# Patient Record
Sex: MALE | Race: BLACK OR AFRICAN AMERICAN | Employment: UNEMPLOYED | URBAN - METROPOLITAN AREA
[De-identification: names, ages, dates, MRNs, and addresses within clinical notes are randomized per-mention and may not be internally consistent; named-entity substitution may affect disease eponyms.]

---

## 2021-09-05 ENCOUNTER — APPOINTMENT (OUTPATIENT)
Dept: GENERAL RADIOLOGY | Age: 36
End: 2021-09-05
Payer: OTHER MISCELLANEOUS

## 2021-09-05 ENCOUNTER — HOSPITAL ENCOUNTER (EMERGENCY)
Age: 36
Discharge: HOME | End: 2021-09-05
Attending: EMERGENCY MEDICINE
Payer: OTHER MISCELLANEOUS

## 2021-09-05 VITALS
WEIGHT: 200 LBS | HEART RATE: 70 BPM | BODY MASS INDEX: 26.51 KG/M2 | DIASTOLIC BLOOD PRESSURE: 85 MMHG | HEIGHT: 73 IN | RESPIRATION RATE: 16 BRPM | TEMPERATURE: 98.5 F | OXYGEN SATURATION: 96 % | SYSTOLIC BLOOD PRESSURE: 143 MMHG

## 2021-09-05 DIAGNOSIS — M79.643 TENDERNESS OF ANATOMICAL SNUFFBOX: ICD-10-CM
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
## 2021-09-05 PROCEDURE — 99284 EMERGENCY DEPT VISIT MOD MDM: CPT

## 2021-09-05 PROCEDURE — 73130 X-RAY EXAM OF HAND: CPT

## 2021-09-05 PROCEDURE — 6370000000 HC RX 637 (ALT 250 FOR IP): Performed by: EMERGENCY MEDICINE

## 2021-09-05 PROCEDURE — 73030 X-RAY EXAM OF SHOULDER: CPT

## 2021-09-05 PROCEDURE — 73110 X-RAY EXAM OF WRIST: CPT

## 2021-09-05 RX ORDER — IBUPROFEN 600 MG/1
600 TABLET ORAL EVERY 8 HOURS PRN
Qty: 30 TABLET | Refills: 0 | Status: SHIPPED | OUTPATIENT
Start: 2021-09-05

## 2021-09-05 RX ORDER — IBUPROFEN 600 MG/1
600 TABLET ORAL ONCE
Status: COMPLETED | OUTPATIENT
Start: 2021-09-05 | End: 2021-09-05

## 2021-09-05 RX ADMIN — IBUPROFEN 600 MG: 600 TABLET, FILM COATED ORAL at 05:20

## 2021-09-05 ASSESSMENT — PAIN DESCRIPTION - LOCATION: LOCATION: LEG

## 2021-09-05 ASSESSMENT — PAIN SCALES - GENERAL
PAINLEVEL_OUTOF10: 7
PAINLEVEL_OUTOF10: 8

## 2021-09-05 ASSESSMENT — PAIN DESCRIPTION - DESCRIPTORS: DESCRIPTORS: ACHING

## 2021-09-05 ASSESSMENT — ENCOUNTER SYMPTOMS
BACK PAIN: 0
SHORTNESS OF BREATH: 0
ABDOMINAL PAIN: 0
VOMITING: 0

## 2021-09-05 ASSESSMENT — PAIN DESCRIPTION - ORIENTATION: ORIENTATION: LEFT

## 2021-09-05 ASSESSMENT — PAIN DESCRIPTION - FREQUENCY: FREQUENCY: CONTINUOUS
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
## 2024-02-27 ENCOUNTER — OFFICE VISIT (OUTPATIENT)
Dept: PRIMARY CARE | Facility: CLINIC | Age: 39
End: 2024-02-27
Payer: MEDICARE

## 2024-02-27 VITALS
BODY MASS INDEX: 27.83 KG/M2 | HEIGHT: 73 IN | WEIGHT: 210 LBS | DIASTOLIC BLOOD PRESSURE: 117 MMHG | SYSTOLIC BLOOD PRESSURE: 139 MMHG | HEART RATE: 81 BPM

## 2024-02-27 DIAGNOSIS — R30.0 DYSURIA: ICD-10-CM

## 2024-02-27 DIAGNOSIS — Z00.00 WELL ADULT EXAM: Primary | ICD-10-CM

## 2024-02-27 DIAGNOSIS — R03.0 ELEVATED BLOOD PRESSURE READING: ICD-10-CM

## 2024-02-27 PROCEDURE — 1036F TOBACCO NON-USER: CPT | Performed by: FAMILY MEDICINE

## 2024-02-27 PROCEDURE — 99386 PREV VISIT NEW AGE 40-64: CPT | Performed by: FAMILY MEDICINE

## 2024-02-27 ASSESSMENT — ENCOUNTER SYMPTOMS
LOSS OF SENSATION IN FEET: 0
OCCASIONAL FEELINGS OF UNSTEADINESS: 0
DEPRESSION: 0

## 2024-02-27 NOTE — PROGRESS NOTES
Pt is here to [facility] care, pt has concerns of overall health check and blood work.       Chief Complaint:  No chief complaint on file.  History Of Present Illness:   Ld Lerma III is a 38 y.o. male        Patient has not had regular care with PCP before - he would like to establish care today.     Patient was diagnosed with narcolepsy with cataplexy many years ago after seeing sleep medicine. Took medications for a short time without significant benefit. Feels like sleep hygiene and good eating habits are helping him manage it. Does not want to see sleep medicine.    Patient reports that he has been relatively healthy and does not take any medications. The day before Candy's Day, caught a cold. At the time, felt like there were some “rocks” in his esophagus. He had chills and sore throat for about 5 days. It has since resolved.    Endorses occasional reflux symptoms that are worse at nighttime.    Also complains of some small hard mobile “lumps” on his chest under the skin that have been there for many years. They are not painful. They have not been growing.       PMH: narcolepsy with cataplexy; denies hx of HTN, diabetes, CAD, cancer    PSH: ganglion cyst removal     Healthcare team: none  ALL: denies     SH: previously smoked marijuana but stopped a few months ago; two months ago, started vaping but then stopped two weeks prior to presentation; 2 year tobacco smoking history; denies illicit drugs     FH: mom- diabetes, HTN; dad - CAD w/stents; granddad had colon cancer when he was 75-80  Flu shot:  - declines     Immunizations:   - utd per pt.     Colonoscopy: none in the past    Mood: denies any active issues     Sleep: endorses good sleep hygiene habits; reports that his narcolepsy had previously interfered with his ability to get a job                           Review of Systems:     Negative  Constitutional:   - fever   - chills   - night sweats  - unexpected weight change     Eyes:   - loss of vision  -  "double vision  - floaters     Ear/Nose/Throat/Mouth:   - sore throat  - hearing changes  - sinus congestion     Cardiovascular:   - chest pain  - chest heaviness  - palpitations  - swelling in ankles     Musculoskeletal:   - bone pain  - muscle pain  - joint pain     Respiratory:   - shortness of breath  - difficulty breathing  - frequent cough  - wheezing     Neurological:   - tremors  - dizzy spells  - numbness   - tingling     Gastrointestinal:   - abdominal pain  - nausea  - vomiting  - constipation  - diarrhea  - bloody stools  - loss of bowel control     Genitourinary:   - urinary incontinence  - increased urinary frequency  - painful urination  - blood in urine     Skin:   - Rash  - worrisome moles     Endocrine:   - excessive thirst  - feeling too hot  - feeling too cold  - feeling tired  - fatigue  Hematologic/Lymphatic:   - swollen glands  - blood clotting problems  - easy bruising.     Psychological:   - feelings of depression  - feelings of anxiety.     Sexual:   - sexual health concerns   ```     Positive  Psychological:   - feeling generally happy  - feeling safe at home                   Last Recorded Vitals:  Vitals:    02/27/24 1038   BP: (!) 139/117   Pulse: 81   Weight: 95.3 kg (210 lb)   Height: 1.854 m (6' 1\")        Past Medical History:  He has no past medical history on file.     Past Surgical History:  He has no past surgical history on file.     Social History:  He reports that he has never smoked. He has never used smokeless tobacco. No history on file for alcohol use and drug use.     Family History:  No family history on file.  Allergies:  Patient has no known allergies.     Outpatient Medications:  No current outpatient medications     Physical Exam:  GENERAL: Well developed, well nourished, alert and cooperative, and appears to be in no acute distress.     PSYCH: mood pleasant and appropriate     HEAD: normocephalic     EYES: PERRL, EOMI. vision is grossly intact.     EARS: Hearing " grossly intact.  Right external auditory canals clear.  Left external auditory canals clear.  .     NOSE:  Nares patent b/l.   No bleeding nasal polyps. No nasal discharge.     THROAT:    Oropharynx clear.  No inflammation, swelling, exudate, or lesions.   Teeth and gingiva in good general condition.     NECK: Neck supple, non-tender.   No masses, no thyromegaly.  No anterior cervical lymphadenopathy.     CARDIAC:   RRR.   No murmur.     LUNGS: Good respiratory effort.  Clear to auscultation b/l without rales, rhonchi, wheezing.     ABD: soft, nontender  no masses palpated.   No HSM. No R/G.  No CVA tenderness to palpation b/l     GAIT: Normal     BACK: No gross spinal deformity on inspection.      EXTREMITIES: All 4 extremities are warm and well perfused.   Peripheral pulses intact.   No varicosities.   No cyanosis, no pallor.   No peripheral edema.     NEUROLOGICAL: Strength and sensation symmetric and intact throughout all 4 extremities.  CN II-XII grossly intact.     SKIN: Skin normal color  On the midsternal chest and R pectoral area, there are 4 small hard, mobile nodules with no overlying skin changes.       MUSCULOSKELETAL: 5/5 strength of bilateral upper and lower extremities.          Assessment/Plan   Problem List Items Addressed This Visit    None  Visit Diagnoses         Codes    Well adult exam    -  Primary Z00.00            Welcome to the office. Please let the office know at least one week in advance if you need an immunization or booster shot.     Ordering fasting blood work, will discuss at your 2-week follow-up visit or I can call you when I get the results.    Regarding the lumps on your anterior chest wall, likely benign, agreed to monitor for now but if your concern arises we can order up imaging such as ultrasound of your chest wall.    Your blood pressure was slightly elevated today in the office, agreed to have you follow-up in 2 weeks to recheck it.    Some of the stomach symptoms around  Candy's Day 2024 may be related to acid reflux, we may rediscuss this at your follow-up visit.    Regarding referrals, you can call the following phone number to attempt to schedule: [phone].  Another potential phone number is: [phone] ([phone]).  The number for pediatric referrals is: [phone].              Shar Amaro, DO

## 2024-08-21 ENCOUNTER — TELEPHONE (OUTPATIENT)
Dept: PRIMARY CARE | Facility: CLINIC | Age: 39
End: 2024-08-21
Payer: MEDICARE

## 2024-09-16 ENCOUNTER — APPOINTMENT (OUTPATIENT)
Dept: PRIMARY CARE | Facility: CLINIC | Age: 39
End: 2024-09-16
Payer: MEDICARE

## 2024-12-03 ENCOUNTER — APPOINTMENT (OUTPATIENT)
Dept: PRIMARY CARE | Facility: CLINIC | Age: 39
End: 2024-12-03
Payer: MEDICARE

## 2025-02-14 ENCOUNTER — OFFICE VISIT (OUTPATIENT)
Dept: URGENT CARE | Age: 40
End: 2025-02-14
Payer: MEDICARE

## 2025-02-14 ENCOUNTER — HOSPITAL ENCOUNTER (EMERGENCY)
Facility: HOSPITAL | Age: 40
Discharge: HOME | End: 2025-02-14
Attending: EMERGENCY MEDICINE
Payer: MEDICARE

## 2025-02-14 ENCOUNTER — APPOINTMENT (OUTPATIENT)
Dept: RADIOLOGY | Facility: HOSPITAL | Age: 40
End: 2025-02-14
Payer: MEDICARE

## 2025-02-14 VITALS
SYSTOLIC BLOOD PRESSURE: 126 MMHG | WEIGHT: 210 LBS | HEIGHT: 73 IN | DIASTOLIC BLOOD PRESSURE: 75 MMHG | RESPIRATION RATE: 18 BRPM | TEMPERATURE: 99 F | OXYGEN SATURATION: 95 % | HEART RATE: 96 BPM | BODY MASS INDEX: 27.83 KG/M2

## 2025-02-14 VITALS
OXYGEN SATURATION: 95 % | TEMPERATURE: 102.4 F | WEIGHT: 210 LBS | SYSTOLIC BLOOD PRESSURE: 148 MMHG | HEART RATE: 104 BPM | RESPIRATION RATE: 16 BRPM | BODY MASS INDEX: 27.71 KG/M2 | DIASTOLIC BLOOD PRESSURE: 75 MMHG

## 2025-02-14 DIAGNOSIS — R68.89 FLU-LIKE SYMPTOMS: Primary | ICD-10-CM

## 2025-02-14 DIAGNOSIS — J11.1 FLU: Primary | ICD-10-CM

## 2025-02-14 DIAGNOSIS — J10.1 INFLUENZA A: ICD-10-CM

## 2025-02-14 LAB
ALBUMIN SERPL BCP-MCNC: 4.5 G/DL (ref 3.4–5)
ALP SERPL-CCNC: 39 U/L (ref 33–120)
ALT SERPL W P-5'-P-CCNC: 25 U/L (ref 10–52)
ANION GAP SERPL CALC-SCNC: 16 MMOL/L (ref 10–20)
AST SERPL W P-5'-P-CCNC: 18 U/L (ref 9–39)
BASOPHILS # BLD AUTO: 0.04 X10*3/UL (ref 0–0.1)
BASOPHILS NFR BLD AUTO: 0.5 %
BILIRUB SERPL-MCNC: 0.6 MG/DL (ref 0–1.2)
BUN SERPL-MCNC: 16 MG/DL (ref 6–23)
CALCIUM SERPL-MCNC: 9.5 MG/DL (ref 8.6–10.6)
CHLORIDE SERPL-SCNC: 99 MMOL/L (ref 98–107)
CO2 SERPL-SCNC: 25 MMOL/L (ref 21–32)
CREAT SERPL-MCNC: 1.04 MG/DL (ref 0.5–1.3)
EGFRCR SERPLBLD CKD-EPI 2021: >90 ML/MIN/1.73M*2
EOSINOPHIL # BLD AUTO: 0 X10*3/UL (ref 0–0.7)
EOSINOPHIL NFR BLD AUTO: 0 %
ERYTHROCYTE [DISTWIDTH] IN BLOOD BY AUTOMATED COUNT: 12.1 % (ref 11.5–14.5)
GLUCOSE SERPL-MCNC: 112 MG/DL (ref 74–99)
HCT VFR BLD AUTO: 40.6 % (ref 41–52)
HGB BLD-MCNC: 14.2 G/DL (ref 13.5–17.5)
IMM GRANULOCYTES # BLD AUTO: 0.06 X10*3/UL (ref 0–0.7)
IMM GRANULOCYTES NFR BLD AUTO: 0.8 % (ref 0–0.9)
LYMPHOCYTES # BLD AUTO: 0.7 X10*3/UL (ref 1.2–4.8)
LYMPHOCYTES NFR BLD AUTO: 9.5 %
MCH RBC QN AUTO: 28.9 PG (ref 26–34)
MCHC RBC AUTO-ENTMCNC: 35 G/DL (ref 32–36)
MCV RBC AUTO: 83 FL (ref 80–100)
MONOCYTES # BLD AUTO: 1.11 X10*3/UL (ref 0.1–1)
MONOCYTES NFR BLD AUTO: 15 %
NEUTROPHILS # BLD AUTO: 5.48 X10*3/UL (ref 1.2–7.7)
NEUTROPHILS NFR BLD AUTO: 74.2 %
NRBC BLD-RTO: 0 /100 WBCS (ref 0–0)
PLATELET # BLD AUTO: 146 X10*3/UL (ref 150–450)
POC RAPID INFLUENZA A: POSITIVE
POC RAPID INFLUENZA B: NEGATIVE
POC SARS-COV-2 AG BINAX: NORMAL
POTASSIUM SERPL-SCNC: 3.6 MMOL/L (ref 3.5–5.3)
PROT SERPL-MCNC: 7.6 G/DL (ref 6.4–8.2)
RBC # BLD AUTO: 4.91 X10*6/UL (ref 4.5–5.9)
SODIUM SERPL-SCNC: 136 MMOL/L (ref 136–145)
WBC # BLD AUTO: 7.4 X10*3/UL (ref 4.4–11.3)

## 2025-02-14 PROCEDURE — 99284 EMERGENCY DEPT VISIT MOD MDM: CPT | Mod: 25 | Performed by: EMERGENCY MEDICINE

## 2025-02-14 PROCEDURE — 71046 X-RAY EXAM CHEST 2 VIEWS: CPT

## 2025-02-14 PROCEDURE — 2500000002 HC RX 250 W HCPCS SELF ADMINISTERED DRUGS (ALT 637 FOR MEDICARE OP, ALT 636 FOR OP/ED): Performed by: EMERGENCY MEDICINE

## 2025-02-14 PROCEDURE — 85025 COMPLETE CBC W/AUTO DIFF WBC: CPT

## 2025-02-14 PROCEDURE — 71046 X-RAY EXAM CHEST 2 VIEWS: CPT | Performed by: RADIOLOGY

## 2025-02-14 PROCEDURE — 36415 COLL VENOUS BLD VENIPUNCTURE: CPT

## 2025-02-14 PROCEDURE — 99284 EMERGENCY DEPT VISIT MOD MDM: CPT

## 2025-02-14 PROCEDURE — 80053 COMPREHEN METABOLIC PANEL: CPT

## 2025-02-14 PROCEDURE — 2500000001 HC RX 250 WO HCPCS SELF ADMINISTERED DRUGS (ALT 637 FOR MEDICARE OP)

## 2025-02-14 PROCEDURE — 2500000004 HC RX 250 GENERAL PHARMACY W/ HCPCS (ALT 636 FOR OP/ED)

## 2025-02-14 RX ORDER — ACETAMINOPHEN 500 MG
1000 TABLET ORAL EVERY 6 HOURS PRN
Qty: 30 TABLET | Refills: 0 | Status: SHIPPED | OUTPATIENT
Start: 2025-02-14 | End: 2025-02-24

## 2025-02-14 RX ORDER — OSELTAMIVIR PHOSPHATE 75 MG/1
75 CAPSULE ORAL ONCE
Status: COMPLETED | OUTPATIENT
Start: 2025-02-14 | End: 2025-02-14

## 2025-02-14 RX ORDER — ONDANSETRON 4 MG/1
4 TABLET, ORALLY DISINTEGRATING ORAL ONCE
Status: COMPLETED | OUTPATIENT
Start: 2025-02-14 | End: 2025-02-14

## 2025-02-14 RX ORDER — ONDANSETRON 4 MG/1
4 TABLET, ORALLY DISINTEGRATING ORAL EVERY 8 HOURS PRN
Qty: 20 TABLET | Refills: 0 | Status: SHIPPED | OUTPATIENT
Start: 2025-02-14 | End: 2025-02-21

## 2025-02-14 RX ORDER — BENZONATATE 100 MG/1
100 CAPSULE ORAL EVERY 8 HOURS
Qty: 21 CAPSULE | Refills: 0 | Status: SHIPPED | OUTPATIENT
Start: 2025-02-14 | End: 2025-02-21

## 2025-02-14 RX ORDER — ACETAMINOPHEN 325 MG/1
975 TABLET ORAL ONCE
Status: COMPLETED | OUTPATIENT
Start: 2025-02-14 | End: 2025-02-14

## 2025-02-14 RX ORDER — OSELTAMIVIR PHOSPHATE 75 MG/1
75 CAPSULE ORAL EVERY 12 HOURS
Qty: 10 CAPSULE | Refills: 0 | Status: SHIPPED | OUTPATIENT
Start: 2025-02-14 | End: 2025-02-19

## 2025-02-14 RX ORDER — IBUPROFEN 600 MG/1
600 TABLET ORAL ONCE
Status: SHIPPED | OUTPATIENT
Start: 2025-02-14

## 2025-02-14 RX ADMIN — ONDANSETRON 4 MG: 4 TABLET, ORALLY DISINTEGRATING ORAL at 16:18

## 2025-02-14 RX ADMIN — OSELTAMIVIR 75 MG: 75 CAPSULE ORAL at 16:17

## 2025-02-14 RX ADMIN — ACETAMINOPHEN 975 MG: 325 TABLET ORAL at 16:17

## 2025-02-14 ASSESSMENT — PAIN DESCRIPTION - LOCATION: LOCATION: GENERALIZED

## 2025-02-14 ASSESSMENT — ENCOUNTER SYMPTOMS
COUGH: 1
FATIGUE: 1
HEADACHES: 1
DIZZINESS: 1
FEVER: 1
CHILLS: 1

## 2025-02-14 ASSESSMENT — PAIN SCALES - WONG BAKER: WONGBAKER_NUMERICALRESPONSE: HURTS WHOLE LOT

## 2025-02-14 ASSESSMENT — PAIN SCALES - GENERAL: PAINLEVEL_OUTOF10: 9

## 2025-02-14 ASSESSMENT — COLUMBIA-SUICIDE SEVERITY RATING SCALE - C-SSRS
2. HAVE YOU ACTUALLY HAD ANY THOUGHTS OF KILLING YOURSELF?: NO
1. IN THE PAST MONTH, HAVE YOU WISHED YOU WERE DEAD OR WISHED YOU COULD GO TO SLEEP AND NOT WAKE UP?: NO
6. HAVE YOU EVER DONE ANYTHING, STARTED TO DO ANYTHING, OR PREPARED TO DO ANYTHING TO END YOUR LIFE?: NO

## 2025-02-14 NOTE — PATIENT INSTRUCTIONS
Tamiflu-1 tablet 2 times a day for 5 days.  Ibuprofen/Tylenol for fever pain.  Okay to do activities after 24 hours of no fevers off ibuprofen Tylenol with symptom improvement.  Rest.  Fluids.  If worsening symptoms, please go to local emergency department for further evaluation.    Please follow up with your primary provider within one week if symptoms do not improve.  You may schedule an appointment online at [email or web address] or call [phone]. Go to the Emergency Department if symptoms significantly worsen or if you develop chest pain or shortness of breath.

## 2025-02-14 NOTE — PROGRESS NOTES
Subjective   Patient ID: Ld Lerma III is a 39 y.o. male. They present today with a chief complaint of Headache (throbbing), Chills, Fatigue, Cough (Onset sx 2days ago), and Generalized Body Aches (Body aches with weakness).    History of Present Illness  Ld Lerma III is a 39 y.o. male who presents to the clinic for 2 days of headaches, chills, body ache, dizziness, cough.  Patient he took DayQuil yesterday with little relief. Pt denies any chest pain, sob, N/V at this time in clinic.             Past Medical History  Allergies as of 02/14/2025    (No Known Allergies)       (Not in a hospital admission)       No past medical history on file.    No past surgical history on file.     reports that he has never smoked. He has never used smokeless tobacco.    Review of Systems  Review of Systems   Constitutional:  Positive for chills, fatigue and fever.   Respiratory:  Positive for cough.    Neurological:  Positive for dizziness and headaches.   All other systems reviewed and are negative.                                 Objective    Vitals:    02/14/25 1334   BP: 148/75   Pulse: 104   Resp: 16   Temp: (!) 39.1 °C (102.4 °F)   TempSrc: Oral   SpO2: 95%   Weight: 95.3 kg (210 lb)     No LMP for male patient.    Physical Exam  Constitutional:       Appearance: He is ill-appearing.   HENT:      Right Ear: Tympanic membrane normal.      Left Ear: Tympanic membrane normal.   Cardiovascular:      Rate and Rhythm: Regular rhythm. Tachycardia present.   Pulmonary:      Effort: Pulmonary effort is normal.      Breath sounds: Normal breath sounds.   Neurological:      General: No focal deficit present.      Mental Status: He is alert and oriented to person, place, and time. Mental status is at baseline.   Psychiatric:         Mood and Affect: Mood normal.         Behavior: Behavior normal.         Procedures    Point of Care Test & Imaging Results from this visit  Results for orders placed or performed in visit on  02/14/25   POCT Influenza A/B manually resulted   Result Value Ref Range    POC Rapid Influenza A Positive (A) Negative    POC Rapid Influenza B Negative Negative   POCT Covid-19 Rapid Antigen   Result Value Ref Range    POC SANDY-COV-2 AG  Presumptive negative test for SARS-CoV-2 (no antigen detected)     Presumptive negative test for SARS-CoV-2 (no antigen detected)      No results found.    Diagnostic study results (if any) were reviewed by CARROL Olsen-CNP.    Assessment/Plan   Allergies, medications, history, and pertinent labs/EKGs/Imaging reviewed by CARROL Olsen-CNP.     Medical Decision Making  Signs, symptoms, history, and examination consistent with influenza. No evidence of strep pharyngitis, sinusitis, AOM, sepsis, pneumonia, or other respiratory complications. Advise supportive measures. Tamiflu is indicated and the medicine sent. Side effects and improvement with Tamiflu explained. Patient is advised to return to clinic or present to ED if symptoms change or worsen. Otherwise follow with PCP  -Risk versus benefit of taking Tamiflu discussed with patient.  Patient like to proceed with ordering the medication.    -600 mg ibuprofen given to patient due to fever and bodyaches.  Patient states his dizziness is not improved post ibuprofen.  Patient states he is still lightheaded and dizzy.  Concern for dehydration versus electrolyte imbalance.  Advised patient to be further evaluated at the local emergency department.  EMS called.  Patient is transported via EMS to local emergency department.  Supervising physician Dr. Pineda updated.      Orders and Diagnoses  Diagnoses and all orders for this visit:  Flu-like symptoms  -     POCT Influenza A/B manually resulted  -     POCT Covid-19 Rapid Antigen  Influenza A  -     ibuprofen tablet 600 mg  -     oseltamivir (Tamiflu) 75 mg capsule; Take 1 capsule (75 mg) by mouth every 12 hours for 5 days.      Medical Admin Record      Patient disposition:  ED    Electronically signed by Wagner De Jesus, APRN-CNP  2:54 PM

## 2025-02-14 NOTE — ED PROVIDER NOTES
History of Present Illness       Limitations to history: None  Independent Historian: patient  External Records Reviewed: EMR, outside records, Care-everywhere    HPI:  HPI   This is a 39-year-old male with a history of GERD presenting to the ED with flulike symptoms for the past 2 days.  Patient was seen in urgent care earlier today and tested positive for flu A. Patient was sent to the ED from urgent care due to complaints of dizziness, headache, and concern for dehydration vs electrolyte imbalance.  His generalized headache was gradual in onset, not the worst headache of his life. He denies any room spinning sensation. Patient states that his symptoms did not improve with ibuprofen of bodyaches. He reports feeling feverish, nauseous. He did not yet get a dose of Tamiflu as this medication was ordered to his pharmacy. He denies any trauma, falls, or injuries. No passing out. Denies any vision changes, neck pain, neck stiffness, chest pain, shortness of breath, abdominal pain, diarrhea, urinary symptoms, numbness, or tingling. He denies any leg pain or leg swelling.       Physical Exam   Physical Exam  Constitutional:       General: He is not in acute distress.     Appearance: He is not ill-appearing.   HENT:      Head: Normocephalic and atraumatic.      Nose: Congestion present.      Mouth/Throat:      Mouth: Mucous membranes are moist.      Pharynx: No oropharyngeal exudate or posterior oropharyngeal erythema.   Eyes:      General: No scleral icterus.     Extraocular Movements: Extraocular movements intact.      Conjunctiva/sclera: Conjunctivae normal.      Pupils: Pupils are equal, round, and reactive to light.   Cardiovascular:      Rate and Rhythm: Normal rate and regular rhythm.      Pulses: Normal pulses.      Heart sounds: Normal heart sounds.   Pulmonary:      Effort: Pulmonary effort is normal. No respiratory distress.      Breath sounds: Normal breath sounds. No wheezing.   Abdominal:      General:  There is no distension.      Palpations: Abdomen is soft.      Tenderness: There is no abdominal tenderness. There is no guarding or rebound.   Musculoskeletal:         General: No swelling or deformity. Normal range of motion.      Cervical back: Normal range of motion and neck supple. No rigidity.   Skin:     General: Skin is warm.      Findings: No rash.   Neurological:      General: No focal deficit present.      Mental Status: He is alert.      Cranial Nerves: No cranial nerve deficit.      Sensory: No sensory deficit.      Motor: No weakness.   Psychiatric:         Mood and Affect: Mood normal.          Triage vitals:  T (!) 38 °C (100.4 °F)    /82  RR 18  O2 (!) 93 % None (Room air)        Medical Decision Making & ED Course     ED Course & MDM     Medical Decision Making  39-year-old male presenting to the ED with flulike symptoms after being sent from urgent care.  He tested positive for flu A at the urgent care and was complaining of lightheadedness, dizziness, body aches and sent to the ED.  History obtained from patient and chart. Here in the ED, the patient was given a dose of Tylenol for temperature of 100.4, will reassess vitals as in triage his pulse ox was 93% on room air.. Patient is neurologically intact. Here in the ED, patient additionally given dose of tamiflu and zofran for nausea.   Basic labs and CXR obtained. CXR was negative for acute process.   CBC showed no leukocytosis. Hemoglobin is normal. Patient has slightly low platelets at 146. CMP was unremarkable. Patient was informed of the results. Patient will follow up with PCP regarding lab findings. Vital signs have improved, pulse ox 95% on RA. Patient states his pain and symptoms have improved. Patient would like to rest at home and is agreeable to plan of tylenol, tamiflu, and other symptomatic treatment. The patient was informed of the results. The patient felt comfortable being discharged home. The patient was  "instructed of supportive measures and to follow-up with a primary care physician. Return precautions were provided, for which the patient expressed understanding. The patient was discharged home in stable condition. They should feel free to return to the Emergency Department at any time should their condition worsen or should they have any questions or concerns.   ----    Visit Vitals  /75 (BP Location: Left arm, Patient Position: Sitting)   Pulse 96   Temp 37.2 °C (99 °F) (Temporal)   Resp 18   Ht 1.854 m (6' 1\")   Wt 95.3 kg (210 lb)   SpO2 95%   BMI 27.71 kg/m²   Smoking Status Never   BSA 2.22 m²        Labs Reviewed   CBC WITH AUTO DIFFERENTIAL - Abnormal       Result Value    WBC 7.4      nRBC 0.0      RBC 4.91      Hemoglobin 14.2      Hematocrit 40.6 (*)     MCV 83      MCH 28.9      MCHC 35.0      RDW 12.1      Platelets 146 (*)     Neutrophils % 74.2      Immature Granulocytes %, Automated 0.8      Lymphocytes % 9.5      Monocytes % 15.0      Eosinophils % 0.0      Basophils % 0.5      Neutrophils Absolute 5.48      Immature Granulocytes Absolute, Automated 0.06      Lymphocytes Absolute 0.70 (*)     Monocytes Absolute 1.11 (*)     Eosinophils Absolute 0.00      Basophils Absolute 0.04     COMPREHENSIVE METABOLIC PANEL - Abnormal    Glucose 112 (*)     Sodium 136      Potassium 3.6      Chloride 99      Bicarbonate 25      Anion Gap 16      Urea Nitrogen 16      Creatinine 1.04      eGFR >90      Calcium 9.5      Albumin 4.5      Alkaline Phosphatase 39      Total Protein 7.6      AST 18      Bilirubin, Total 0.6      ALT 25         XR chest 2 views   Final Result   No evidence of acute intrathoracic abnormality.        Signed by: Reynold Gil 2/14/2025 4:42 PM   Dictation workstation:   [id number]          ED Course:  Diagnoses as of 02/14/25 1749   Flu     Disposition   Discharged in stable condition.     Procedures   Procedures    Kiki Smart PA-C  Emergency Medicine      Kiki Smart, " FRANCES  02/14/25 7434    This patient was seen by the advanced practice provider.  I have personally performed a substantive portion of the encounter.  I have seen and examined the patient; agree with the workup, evaluation, MDM, management and diagnosis.  The care plan has been discussed.      I personally saw the patient and made/approved the management plan and take responsibility for the patient management.    MD Jenna Moreno MD  02/15/25 9545

## 2025-02-14 NOTE — DISCHARGE INSTRUCTIONS
Take Tessalon as needed for cough, Zofran for nausea, ibuprofen/Tylenol for fevers and bodyaches.  Return to ED if symptoms worsen or new symptoms arise.

## 2025-02-14 NOTE — ED TRIAGE NOTES
Pt presents to the ED for flu like symptoms. Pt states he went to the urgent this morning where he was told he was Flu A positive. Pt present sot the ED for dizziness, chills and headache related to his Flu diagnosis. Pt has no other PMHX and does not complain of CP or SOB